# Patient Record
Sex: FEMALE | Race: WHITE | NOT HISPANIC OR LATINO | Employment: OTHER | URBAN - NONMETROPOLITAN AREA
[De-identification: names, ages, dates, MRNs, and addresses within clinical notes are randomized per-mention and may not be internally consistent; named-entity substitution may affect disease eponyms.]

---

## 2023-05-16 ENCOUNTER — OFFICE VISIT (OUTPATIENT)
Dept: CARDIOLOGY | Facility: CLINIC | Age: 63
End: 2023-05-16
Payer: MEDICARE

## 2023-05-16 VITALS
HEIGHT: 66 IN | DIASTOLIC BLOOD PRESSURE: 64 MMHG | HEART RATE: 82 BPM | WEIGHT: 222 LBS | BODY MASS INDEX: 35.68 KG/M2 | OXYGEN SATURATION: 95 % | SYSTOLIC BLOOD PRESSURE: 134 MMHG

## 2023-05-16 DIAGNOSIS — I10 HYPERTENSION, UNSPECIFIED TYPE: ICD-10-CM

## 2023-05-16 DIAGNOSIS — G47.36 HYPOXEMIA ASSOCIATED WITH SLEEP: ICD-10-CM

## 2023-05-16 DIAGNOSIS — G47.33 OSA (OBSTRUCTIVE SLEEP APNEA): Primary | ICD-10-CM

## 2023-05-16 DIAGNOSIS — G47.10 HYPERSOMNIA, UNSPECIFIED: ICD-10-CM

## 2023-05-16 RX ORDER — LISINOPRIL 20 MG/1
1 TABLET ORAL DAILY
COMMUNITY

## 2023-05-16 RX ORDER — ATORVASTATIN CALCIUM 20 MG/1
1 TABLET, FILM COATED ORAL DAILY
COMMUNITY
Start: 2023-02-27

## 2023-05-16 RX ORDER — CITALOPRAM 20 MG/1
1 TABLET ORAL DAILY
COMMUNITY
Start: 2023-04-27

## 2023-05-16 RX ORDER — LEVOTHYROXINE SODIUM 0.1 MG/1
1 TABLET ORAL DAILY
COMMUNITY

## 2023-05-16 RX ORDER — ALBUTEROL SULFATE 90 UG/1
AEROSOL, METERED RESPIRATORY (INHALATION)
COMMUNITY

## 2023-05-16 RX ORDER — DILTIAZEM HYDROCHLORIDE 300 MG/1
1 CAPSULE, COATED, EXTENDED RELEASE ORAL DAILY
COMMUNITY
Start: 2023-03-04

## 2023-05-16 RX ORDER — LEVOCETIRIZINE DIHYDROCHLORIDE 5 MG/1
1 TABLET, FILM COATED ORAL DAILY
COMMUNITY
Start: 2023-04-07

## 2023-05-16 RX ORDER — GABAPENTIN 600 MG/1
1 TABLET ORAL 3 TIMES DAILY
COMMUNITY

## 2023-05-16 RX ORDER — CYCLOBENZAPRINE HCL 10 MG
1 TABLET ORAL DAILY
COMMUNITY
Start: 2023-04-07

## 2023-05-16 RX ORDER — PANTOPRAZOLE SODIUM 40 MG/1
1 TABLET, DELAYED RELEASE ORAL EVERY 12 HOURS SCHEDULED
COMMUNITY

## 2023-05-16 RX ORDER — SUCRALFATE 1 G/1
1 TABLET ORAL 2 TIMES DAILY
COMMUNITY

## 2023-05-16 RX ORDER — TRAMADOL HYDROCHLORIDE 50 MG/1
1 TABLET ORAL DAILY
COMMUNITY

## 2023-05-16 RX ORDER — MONTELUKAST SODIUM 10 MG/1
1 TABLET ORAL DAILY
COMMUNITY

## 2023-05-16 NOTE — ASSESSMENT & PLAN NOTE
Baseline AHI 12 this is mild sleep apnea.  Increases to 67 in REM sleep and that is severe.    She is on CPAP therapy.  Download is reviewed excellent control good compliance at 77%.  She is benefiting from CPAP therapy.  We plan to continue CPAP therapy.  She is encouraged to increase CPAP use.    Prescription for CPAP supplies to the DME of her choice.

## 2023-05-16 NOTE — ASSESSMENT & PLAN NOTE
Blood pressure today 134/64.  She follows with cardiology for tachycardia.  She reports this has been well controlled on her diltiazem.    She is encouraged to continue PAP therapy as untreated sleep apnea may potentiate her hypertension and tachycardia.

## 2023-05-16 NOTE — PROGRESS NOTES
Follow-Up Sleep Consult     Date:   2023  Name: Aly Armando  :   1960  PCP: Steffi Leija APRN    Chief Complaint   Patient presents with   • Sleep Apnea       Subjective     History of Present Illness  Aly Armando is a 63 y.o. female who presents today for follow-up on KO.  Today is a scheduled 6-month follow-up on her known history of mild sleep apnea.  She has a CPAP plus O2 at 2 L nightly.    She reports that she has a bad habit of sitting in front of the television and falling asleep without her Pap device at night.  She reports that occasionally she will wake up at night during CPAP use and realize she is pulled her mask off.  She reports she is having a bit more of excessive daytime sleepiness.  She reports that if she is sitting idle that she will doze off.  She reports she will doze off in front of the TV in the evenings.    She reports her DME is helpful for supplies.  She reports that her fullface mask is a good fit.  She reports that her PAP device is functioning well.  She reports she has an O2 concentrator and her DME changes the filter annually.    No specialty comments available.    History of KO with a baseline AHI of 12 that increased to 67 and REM on a study November 3, 2021.       Current Treatment: Auto CPAP 6 to 16 cm on a 3B device.  She has O2 at 2 L nightly connected to her CPAP.    Current mask full facemask.          The patient's relevant past medical, surgical, family, and social history reviewed and updated in Epic as appropriate.    Past Medical History:   Diagnosis Date   • Anemia    • Gallstones    • GERD (gastroesophageal reflux disease)    • Hypertension    • KO (obstructive sleep apnea)     AHI of 12; REM 67   • Tachycardia    • Thyroid disease      Past Surgical History:   Procedure Laterality Date   • BUNIONECTOMY  2009   •  SECTION  1989   • CHOLECYSTECTOMY  1984     OB History    No obstetric history on file.       No  "Known Allergies  Prior to Admission medications    Medication Sig Start Date End Date Taking? Authorizing Provider   albuterol sulfate  (90 Base) MCG/ACT inhaler albuterol sulfate HFA 90 mcg/actuation aerosol inhaler   Yes Luis Alexis MD   atorvastatin (LIPITOR) 20 MG tablet Take 1 tablet by mouth Daily. 2/27/23  Yes Luis Alexis MD   citalopram (CeleXA) 20 MG tablet Take 1 tablet by mouth Daily. 4/27/23  Yes Luis Alexis MD   cyclobenzaprine (FLEXERIL) 10 MG tablet Take 1 tablet by mouth Daily. 4/7/23  Yes Provider, Historical, MD   dilTIAZem CD (CARDIZEM CD) 300 MG 24 hr capsule Take 1 capsule by mouth Daily. 3/4/23  Yes Provider, Historical, MD   gabapentin (NEURONTIN) 600 MG tablet Take 1 tablet by mouth 3 (Three) Times a Day.   Yes Luis Alexis MD   levocetirizine (XYZAL) 5 MG tablet Take 1 tablet by mouth Daily. 4/7/23  Yes Luis Alexis MD   levothyroxine (SYNTHROID, LEVOTHROID) 100 MCG tablet Take 1 tablet by mouth Daily.   Yes Luis Alexis MD   lisinopril (PRINIVIL,ZESTRIL) 20 MG tablet Take 1 tablet by mouth Daily.   Yes Luis Alexis MD   montelukast (SINGULAIR) 10 MG tablet Take 1 tablet by mouth Daily.   Yes Luis Alexis MD   pantoprazole (PROTONIX) 40 MG EC tablet Take 1 tablet by mouth Every 12 (Twelve) Hours.   Yes Luis Alexis MD   sucralfate (CARAFATE) 1 g tablet Take 1 tablet by mouth 2 (Two) Times a Day.   Yes Luis Alexis MD   traMADol (ULTRAM) 50 MG tablet Take 1 tablet by mouth Daily.   Yes Luis Alexis MD     History reviewed. No pertinent family history.    Objective     Vital Signs:  /64 (BP Location: Left arm, Patient Position: Sitting)   Pulse 82   Ht 167.6 cm (66\")   Wt 101 kg (222 lb)   SpO2 95%   BMI 35.83 kg/m²     Class 2 Severe Obesity (BMI >=35 and <=39.9). Obesity-related health conditions include the following: obstructive sleep apnea, hypertension and GERD. Obesity " is worsening. BMI is is above average; BMI management plan is completed. We discussed portion control and increasing exercise.        Physical Exam  Constitutional:       Appearance: Normal appearance.   HENT:      Head: Normocephalic.   Pulmonary:      Effort: Pulmonary effort is normal.   Musculoskeletal:         General: Normal range of motion.      Cervical back: Neck supple.   Neurological:      Mental Status: She is alert and oriented to person, place, and time.   Psychiatric:         Mood and Affect: Mood normal.         Behavior: Behavior normal.         Thought Content: Thought content normal.         The following data was reviewed by: MISSY Sanchez on 05/16/2023:    PAP download reviewed: CPAP download dated 4/11 through 5/10/2023.  30-day download.  I have reviewed and interpreted the data on this download at today's visit.         Assessment and Plan     Diagnoses and all orders for this visit:    1. KO (obstructive sleep apnea) (Primary)  Assessment & Plan:  Baseline AHI 12 this is mild sleep apnea.  Increases to 67 in REM sleep and that is severe.    She is on CPAP therapy.  Download is reviewed excellent control good compliance at 77%.  She is benefiting from CPAP therapy.  We plan to continue CPAP therapy.  She is encouraged to increase CPAP use.    Prescription for CPAP supplies to the DME of her choice.    Orders:  -     Titration; Future  -     PAP Therapy    2. Hypoxemia associated with sleep  Assessment & Plan:  She has O2 at 2 L connected to her CPAP.  She uses her O2 nightly with CPAP.    She does not use oxygen in the daytime.  Appears oxygen was ordered after having a mildly low pulse ox on CPAP therapy.    Plan an in lab titration for further evaluation.  Quantify the need for oxygen with PAP therapy.    Orders:  -     Titration; Future    3. Hypersomnia, unspecified  Assessment & Plan:  She reports that she is having a bit of excessive daytime sleepiness.  She reports when  she is sitting idle that she will fall asleep.  She will doze off.    She is encouraged to increase her CPAP use to nightly and 7 hours per night.    Planning an in lab titration study for further evaluation as her pressures may need adjusting.      4. Hypertension, unspecified type  Assessment & Plan:  Blood pressure today 134/64.  She follows with cardiology for tachycardia.  She reports this has been well controlled on her diltiazem.    She is encouraged to continue PAP therapy as untreated sleep apnea may potentiate her hypertension and tachycardia.        Report if any new/changing symptoms immediately and Increase pap therapy usage         Follow Up  Return in about 2 months (around 7/16/2023) for KO/ Titration study results .  Patient was given instructions and counseling regarding her condition or for health maintenance advice. Please see specific information pulled into the AVS if appropriate.

## 2023-05-16 NOTE — ASSESSMENT & PLAN NOTE
She has O2 at 2 L connected to her CPAP.  She uses her O2 nightly with CPAP.    She does not use oxygen in the daytime.  Appears oxygen was ordered after having a mildly low pulse ox on CPAP therapy.    Plan an in lab titration for further evaluation.  Quantify the need for oxygen with PAP therapy.

## 2023-05-16 NOTE — ASSESSMENT & PLAN NOTE
She reports that she is having a bit of excessive daytime sleepiness.  She reports when she is sitting idle that she will fall asleep.  She will doze off.    She is encouraged to increase her CPAP use to nightly and 7 hours per night.    Planning an in lab titration study for further evaluation as her pressures may need adjusting.

## 2023-05-24 DIAGNOSIS — G47.33 OSA (OBSTRUCTIVE SLEEP APNEA): ICD-10-CM

## 2023-05-24 DIAGNOSIS — G47.36 HYPOXEMIA ASSOCIATED WITH SLEEP: ICD-10-CM

## 2023-05-26 ENCOUNTER — TELEPHONE (OUTPATIENT)
Dept: CARDIOLOGY | Facility: CLINIC | Age: 63
End: 2023-05-26
Payer: MEDICARE

## 2023-05-26 NOTE — TELEPHONE ENCOUNTER
Talked with patient, patient understood.  Patient states she wants to start the BiPAP with no O2 as soon as she can and she will follow up in July.

## 2023-05-26 NOTE — TELEPHONE ENCOUNTER
----- Message from MISSY Sanchez sent at 5/25/2023  5:52 PM EDT -----  Regarding: Titration study results  Please call the patient and let Her know that I have seen her Titration results. We found out that CPAP is not doing a good enough job and fails to correct her KO. She has given a trial of BIPAP and this did a better job of correcting her KO and it also corrected her Oxygen drops (desats). So, the good news is with BIPAP, it appears on this night of study that she does on need Oxygen. But, I need to order her a New sleep machine- a BIPAP.     Now, if she wishes to come in a discuss all this in more detail then she has a follow up in about 5 weeks. She is save enough on CPAP and O2 to wait until then. Or I can go on and order the BIPAP and she can start it without the O2 and we can see how she is doing on the new BIPAP at her July follow up. Either way is acceptable. Let me know what she decides to do.   ----- Message -----  From: Azeb Coker Incoming  Sent: 5/24/2023   2:08 PM EDT  To: MISSY Sanchez

## 2023-05-30 DIAGNOSIS — G47.33 OSA (OBSTRUCTIVE SLEEP APNEA): Primary | ICD-10-CM

## 2023-10-18 ENCOUNTER — OFFICE VISIT (OUTPATIENT)
Dept: CARDIOLOGY | Facility: CLINIC | Age: 63
End: 2023-10-18
Payer: MEDICARE

## 2023-10-18 VITALS
HEART RATE: 86 BPM | SYSTOLIC BLOOD PRESSURE: 134 MMHG | OXYGEN SATURATION: 94 % | DIASTOLIC BLOOD PRESSURE: 76 MMHG | HEIGHT: 66 IN | BODY MASS INDEX: 36 KG/M2 | WEIGHT: 224 LBS

## 2023-10-18 DIAGNOSIS — G47.36 HYPOXEMIA ASSOCIATED WITH SLEEP: ICD-10-CM

## 2023-10-18 DIAGNOSIS — G47.33 OSA (OBSTRUCTIVE SLEEP APNEA): Primary | ICD-10-CM

## 2023-10-18 PROCEDURE — 1159F MED LIST DOCD IN RCRD: CPT | Performed by: NURSE PRACTITIONER

## 2023-10-18 PROCEDURE — 1160F RVW MEDS BY RX/DR IN RCRD: CPT | Performed by: NURSE PRACTITIONER

## 2023-10-18 PROCEDURE — 3075F SYST BP GE 130 - 139MM HG: CPT | Performed by: NURSE PRACTITIONER

## 2023-10-18 PROCEDURE — 3078F DIAST BP <80 MM HG: CPT | Performed by: NURSE PRACTITIONER

## 2023-10-18 PROCEDURE — 99213 OFFICE O/P EST LOW 20 MIN: CPT | Performed by: NURSE PRACTITIONER

## 2023-10-18 NOTE — PROGRESS NOTES
Follow-Up Sleep Consult     Date:   10/18/2023  Name: Aly Armando  :   1960  PCP: Steffi Leija APRN    Chief Complaint   Patient presents with    Sleep Apnea       Subjective     History of Present Illness  Aly Armando is a 63 y.o. female who presents today for follow-up on KO.  Patient has a history of KO and was recently switched to a BiPAP and is here for a 31-90-day compliance visit.  Patient reports that she is doing much better on the BiPAP device.  Download reviewed and interpreted today.  Compliance is 97%, when used >4 hours is 97%.  Average use per night is 6 hours and 26 minutes.  AHI is 3.6.  Patient denies excessive daytime sleepiness and fatigue.  She is feeling much better at this time.  She was previously using oxygen with her CPAP device.  Per titration study on 2023 oxygen desaturation was present and corrected with BiPAP titration.  Patient has been using oxygen with her BiPAP. I recommend that she stop using it at night with her BiPAP to see how she does with that.  She most likely does not need supplemental oxygen with her BiPAP now. She denies any other concerns or complaints today.  Overall patient is doing very well.    History of KO with baseline AHI of 12 on 11/3/2021, increased to 67 in REM    Titration study 2023-successful BiPAP titration at a pressure setting of 20/14 cm.  There was persistent sleep apnea in supine position on 19 cm.  CPAP failed to correct.  Oxygen desaturation was present and corrected with BiPAP titration.    History of KO with a baseline AHI of 12.     Current Treatment: BiPAP 20/10 cm, PS 4  Current mask used is full face mask    Device Functioning Well: Yes  Mask Fit Comfortable: Yes  Air Flow Comfortable: Yes  DME Helpful for Supplies: Yes  Sleep is rested: Yes    Device Download:                     The patient's relevant past medical, surgical, family, and social history reviewed and updated in Epic as  appropriate.    Past Medical History:   Diagnosis Date    Anemia     Gallstones     GERD (gastroesophageal reflux disease)     Hypertension     KO (obstructive sleep apnea)     AHI of 12; REM 67    Tachycardia     Thyroid disease      Past Surgical History:   Procedure Laterality Date    BUNIONECTOMY  2009     SECTION  1989    CHOLECYSTECTOMY  1984     OB History    No obstetric history on file.       No Known Allergies  Prior to Admission medications    Medication Sig Start Date End Date Taking? Authorizing Provider   albuterol sulfate  (90 Base) MCG/ACT inhaler albuterol sulfate HFA 90 mcg/actuation aerosol inhaler   Yes Luis Alexis MD   atorvastatin (LIPITOR) 20 MG tablet Take 1 tablet by mouth Daily. 23  Yes Luis Alexis MD   citalopram (CeleXA) 20 MG tablet Take 1 tablet by mouth Daily. 23  Yes Luis Alexis MD   cyclobenzaprine (FLEXERIL) 10 MG tablet Take 1 tablet by mouth Daily. 23  Yes Luis Alexis MD   dilTIAZem CD (CARDIZEM CD) 300 MG 24 hr capsule Take 1 capsule by mouth Daily. 3/4/23  Yes ProviderLuis MD   gabapentin (NEURONTIN) 600 MG tablet Take 1 tablet by mouth 3 (Three) Times a Day.   Yes ProviderLuis MD   levocetirizine (XYZAL) 5 MG tablet Take 1 tablet by mouth Daily. 23  Yes Luis Alexis MD   levothyroxine (SYNTHROID, LEVOTHROID) 100 MCG tablet Take 1 tablet by mouth Daily.   Yes ProviderLuis MD   montelukast (SINGULAIR) 10 MG tablet Take 1 tablet by mouth Daily.   Yes ProviderLuis MD   pantoprazole (PROTONIX) 40 MG EC tablet Take 1 tablet by mouth Every 12 (Twelve) Hours.   Yes ProviderLuis MD   sucralfate (CARAFATE) 1 g tablet Take 1 tablet by mouth 2 (Two) Times a Day.   Yes Luis Alexis MD   traMADol (ULTRAM) 50 MG tablet Take 1 tablet by mouth Daily.   Yes Luis Alexis MD   lisinopril (PRINIVIL,ZESTRIL) 20 MG tablet Take 1 tablet by mouth  "Daily.  10/18/23  Provider, Luis, MD     History reviewed. No pertinent family history.    Objective     Vital Signs:  /76   Pulse 86   Ht 167.6 cm (66\")   Wt 102 kg (224 lb)   SpO2 94%   BMI 36.15 kg/m²              Physical Exam  Vitals reviewed.   Constitutional:       Appearance: Normal appearance.   HENT:      Head: Normocephalic.   Cardiovascular:      Rate and Rhythm: Normal rate and regular rhythm.      Heart sounds: Normal heart sounds.   Pulmonary:      Effort: Pulmonary effort is normal.      Breath sounds: Normal breath sounds.   Musculoskeletal:      Right lower leg: No edema.      Left lower leg: No edema.   Skin:     General: Skin is warm and dry.      Capillary Refill: Capillary refill takes less than 2 seconds.   Neurological:      General: No focal deficit present.      Mental Status: She is alert and oriented to person, place, and time.   Psychiatric:         Mood and Affect: Mood normal.         Behavior: Behavior normal.             PAP download reviewed: 9/18/23-10/17/23         Assessment and Plan     Diagnoses and all orders for this visit:    1. KO (obstructive sleep apnea) (Primary)  Assessment & Plan:  Patient completed a titration study on 5/23/2023 that revealed a successful BiPAP titration at a pressure setting of 20/14 cm.  Oxygen desaturation was present and corrected with BiPAP titration.  Patient was recently switched to a BiPAP device and is here for a compliance visit.  Download reviewed and interpreted today.  Compliance is 97%, when used >4 hours is 97%.  Average use per night is 6 hours and 26 minutes.  AHI is 3.6.  Patient denies excessive daytime sleepiness and fatigue.  She is feeling much better at this time.      - Continue BiPAP therapy at current settings.      2. Hypoxemia associated with sleep  Assessment & Plan:  She was previously using oxygen with her CPAP device.  Per titration study on 5/23/2023 oxygen desaturation was present and corrected with " BiPAP titration.  Patient has been using oxygen with her BiPAP at night. I recommend that she stop using it at night with her BiPAP to see how she does with that.  She most likely does not need supplemental oxygen now that she is using a BIPAP.   -We will reassess in 3 months.           Report if any new/changing symptoms immediately         Follow Up  Return in about 3 months (around 1/18/2024) for KO.  Patient was given instructions and counseling regarding her condition or for health maintenance advice. Please see specific information pulled into the AVS if appropriate.

## 2023-10-18 NOTE — ASSESSMENT & PLAN NOTE
Patient completed a titration study on 5/23/2023 that revealed a successful BiPAP titration at a pressure setting of 20/14 cm.  Oxygen desaturation was present and corrected with BiPAP titration.  Patient was recently switched to a BiPAP device and is here for a compliance visit.  Download reviewed and interpreted today.  Compliance is 97%, when used >4 hours is 97%.  Average use per night is 6 hours and 26 minutes.  AHI is 3.6.  Patient denies excessive daytime sleepiness and fatigue.  She is feeling much better at this time.      - Continue BiPAP therapy at current settings.

## 2023-10-18 NOTE — ASSESSMENT & PLAN NOTE
She was previously using oxygen with her CPAP device.  Per titration study on 5/23/2023 oxygen desaturation was present and corrected with BiPAP titration.  Patient has been using oxygen with her BiPAP at night. I recommend that she stop using it at night with her BiPAP to see how she does with that.  She most likely does not need supplemental oxygen now that she is using a BIPAP.   -We will reassess in 3 months.

## 2024-01-17 ENCOUNTER — OFFICE VISIT (OUTPATIENT)
Dept: CARDIOLOGY | Facility: CLINIC | Age: 64
End: 2024-01-17
Payer: MEDICARE

## 2024-01-17 VITALS
WEIGHT: 233 LBS | HEIGHT: 66 IN | BODY MASS INDEX: 37.45 KG/M2 | DIASTOLIC BLOOD PRESSURE: 78 MMHG | OXYGEN SATURATION: 94 % | HEART RATE: 58 BPM | SYSTOLIC BLOOD PRESSURE: 124 MMHG

## 2024-01-17 DIAGNOSIS — G47.33 OSA (OBSTRUCTIVE SLEEP APNEA): Primary | ICD-10-CM

## 2024-01-17 DIAGNOSIS — G47.36 HYPOXEMIA ASSOCIATED WITH SLEEP: ICD-10-CM

## 2024-01-17 RX ORDER — NAPROXEN 500 MG/1
500 TABLET ORAL 2 TIMES DAILY WITH MEALS
COMMUNITY

## 2024-01-17 NOTE — ASSESSMENT & PLAN NOTE
Per try titration study on 5/23/2023 oxygen corrected on BiPAP.  Supplemental oxygen was not needed.    She has been off of her supplemental oxygen that was attached to BiPAP x 3 months.    She reports that she feels good.  She does not feel any different off oxygen.    Plan: Discontinue nocturnal oxygen and order sent to DME.  Continue BiPAP.

## 2024-01-17 NOTE — ASSESSMENT & PLAN NOTE
Baseline AHI is 12.  Increased to 67 in rem.  This is moderate to severe sleep apnea.    She is on BiPAP therapy.  Download is reviewed with good control and good compliance.    She is benefiting from BiPAP therapy with plan to continue BiPAP therapy.    Prescription for PAP supplies to the DME of her choice.    Follow-up in 6 months for KO or sooner for any KO or PAP concerns.

## 2024-01-17 NOTE — PROGRESS NOTES
Follow-Up Sleep Consult     Date:   2024  Name: Aly Armando  :   1960  PCP: Steffi Leija APRN    Chief Complaint   Patient presents with    Sleep Apnea       Subjective     History of Present Illness  Aly Armando is a 63 y.o. female who presents today for follow-up on KO.    Today is a 3-month follow-up to reevaluate how she is feeling off oxygen at night and only on BiPAP at night.    She reports that she feels the same.  She has not felt any different off of her oxygen.    History of KO with baseline AHI of 12 on 11/3/2021, increased to 67 in REM    Titration study 2023-successful BiPAP titration at a pressure setting of 20/14 cm.  There was persistent sleep apnea in supine position on 19 cm.  CPAP failed to correct.  Oxygen desaturation was present and corrected with BiPAP titration.      Current mask used is fullface mask    Device Functioning Well: Yes  Mask Fit Comfortable: Yes - air leaks at times   Air Flow Comfortable: Yes  DME Helpful for Supplies: Yes  Sleep is rested: Yes        Device Download:                     The patient's relevant past medical, surgical, family, and social history reviewed and updated in Epic as appropriate.    Past Medical History:   Diagnosis Date    Anemia     Gallstones     GERD (gastroesophageal reflux disease)     Hypertension     KO (obstructive sleep apnea)     AHI of 12; REM 67    Tachycardia     Thyroid disease      Past Surgical History:   Procedure Laterality Date    BUNIONECTOMY  2009     SECTION  1989    CHOLECYSTECTOMY  1984     OB History    No obstetric history on file.       No Known Allergies  Prior to Admission medications    Medication Sig Start Date End Date Taking? Authorizing Provider   atorvastatin (LIPITOR) 20 MG tablet Take 1 tablet by mouth Daily. 23  Yes Luis Alexis MD   citalopram (CeleXA) 20 MG tablet Take 1 tablet by mouth Daily. 23  Yes Luis Alexis MD  "  cyclobenzaprine (FLEXERIL) 10 MG tablet Take 1 tablet by mouth Daily. 4/7/23  Yes Luis Alexis MD   dilTIAZem CD (CARDIZEM CD) 300 MG 24 hr capsule Take 1 capsule by mouth Daily. 3/4/23  Yes Luis Alexis MD   gabapentin (NEURONTIN) 600 MG tablet Take 1 tablet by mouth 3 (Three) Times a Day.   Yes Luis lAexis MD   levocetirizine (XYZAL) 5 MG tablet Take 1 tablet by mouth Daily. 4/7/23  Yes Luis Alexis MD   levothyroxine (SYNTHROID, LEVOTHROID) 100 MCG tablet Take 1 tablet by mouth Daily.   Yes Luis Alexis MD   montelukast (SINGULAIR) 10 MG tablet Take 1 tablet by mouth Daily.   Yes Provider, Historical, MD   naproxen (NAPROSYN) 500 MG tablet Take 1 tablet by mouth 2 (Two) Times a Day With Meals.   Yes Luis Alexis MD   pantoprazole (PROTONIX) 40 MG EC tablet Take 1 tablet by mouth Every 12 (Twelve) Hours.   Yes Luis Alexis MD   sucralfate (CARAFATE) 1 g tablet Take 1 tablet by mouth 2 (Two) Times a Day.   Yes Luis Alexis MD   traMADol (ULTRAM) 50 MG tablet Take 1 tablet by mouth Daily.   Yes Luis Alexis MD   albuterol sulfate  (90 Base) MCG/ACT inhaler albuterol sulfate HFA 90 mcg/actuation aerosol inhaler  1/17/24  Luis Alexis MD     History reviewed. No pertinent family history.    Objective     Vital Signs:  /78   Pulse 58   Ht 167.6 cm (66\")   Wt 106 kg (233 lb)   SpO2 94%   BMI 37.61 kg/m²              Physical Exam  HENT:      Head: Normocephalic.      Nose: Nose normal.      Mouth/Throat:      Mouth: Mucous membranes are moist.   Pulmonary:      Effort: Pulmonary effort is normal.   Skin:     General: Skin is warm and dry.   Neurological:      Mental Status: She is alert and oriented to person, place, and time.   Psychiatric:         Mood and Affect: Mood normal.         Behavior: Behavior normal.         Thought Content: Thought content normal.         The following data was reviewed by: " Gilma Villasenor, APRN on 01/17/2024:    PAP download reviewed: 12/17/2023 through 1/15/2024.  30-day download.  I have reviewed and interpreted the data on the download.         Assessment and Plan     Diagnoses and all orders for this visit:    1. KO (obstructive sleep apnea) (Primary)  Assessment & Plan:  Baseline AHI is 12.  Increased to 67 in rem.  This is moderate to severe sleep apnea.    She is on BiPAP therapy.  Download is reviewed with good control and good compliance.    She is benefiting from BiPAP therapy with plan to continue BiPAP therapy.    Prescription for PAP supplies to the DME of her choice.    Follow-up in 6 months for KO or sooner for any KO or PAP concerns.    Orders:  -     PAP Therapy    2. Hypoxemia associated with sleep  Assessment & Plan:  Per try titration study on 5/23/2023 oxygen corrected on BiPAP.  Supplemental oxygen was not needed.    She has been off of her supplemental oxygen that was attached to BiPAP x 3 months.    She reports that she feels good.  She does not feel any different off oxygen.    Plan: Discontinue nocturnal oxygen and order sent to DME.  Continue BiPAP.          Report if any new/changing symptoms immediately         Follow Up  Return in about 6 months (around 7/17/2024) for KO.  Patient was given instructions and counseling regarding her condition or for health maintenance advice. Please see specific information pulled into the AVS if appropriate.

## 2024-07-16 ENCOUNTER — OFFICE VISIT (OUTPATIENT)
Dept: CARDIOLOGY | Facility: CLINIC | Age: 64
End: 2024-07-16
Payer: MEDICARE

## 2024-07-16 VITALS
OXYGEN SATURATION: 96 % | WEIGHT: 239 LBS | HEART RATE: 84 BPM | SYSTOLIC BLOOD PRESSURE: 122 MMHG | BODY MASS INDEX: 38.41 KG/M2 | HEIGHT: 66 IN | DIASTOLIC BLOOD PRESSURE: 84 MMHG

## 2024-07-16 DIAGNOSIS — G47.33 OSA (OBSTRUCTIVE SLEEP APNEA): Primary | ICD-10-CM

## 2024-07-16 PROCEDURE — 1159F MED LIST DOCD IN RCRD: CPT | Performed by: NURSE PRACTITIONER

## 2024-07-16 PROCEDURE — 3079F DIAST BP 80-89 MM HG: CPT | Performed by: NURSE PRACTITIONER

## 2024-07-16 PROCEDURE — 1160F RVW MEDS BY RX/DR IN RCRD: CPT | Performed by: NURSE PRACTITIONER

## 2024-07-16 PROCEDURE — 99213 OFFICE O/P EST LOW 20 MIN: CPT | Performed by: NURSE PRACTITIONER

## 2024-07-16 PROCEDURE — 3074F SYST BP LT 130 MM HG: CPT | Performed by: NURSE PRACTITIONER

## 2024-07-16 NOTE — ASSESSMENT & PLAN NOTE
Baseline AHI is 12.  This is mild sleep apnea.  AHI increased to 67 in REM sleep and this is more severe.    She is on BiPAP therapy.  Download reviewed with good control and good compliance.    She is benefiting from BiPAP therapy and we plan to continue BiPAP therapy.    Prescription to the DME of patient's choice for her BiPAP supplies.    Plan follow-up for KO on BiPAP in 1 year or sooner for any KO or PAP concerns.

## 2024-07-16 NOTE — PROGRESS NOTES
Follow-Up Sleep Consult     Date:   2024  Name: Aly Armando  :   1960  PCP: Steffi Leija APRN    Chief Complaint   Patient presents with    Sleep Apnea       Subjective     History of Present Illness  Aly Armando is a 64 y.o. female who presents today for follow-up on KO.  She comes in today for her annual visit for her known history of sleep apnea on BiPAP therapy.    History of KO with baseline AHI of 12 on 11/3/2021, increased to 67 in REM    Titration study 2023-successful BiPAP titration at a pressure setting of 20/14 cm.  There was persistent sleep apnea in supine position on 19 cm.  CPAP failed to correct.  Oxygen desaturation was present and corrected with BiPAP titration.    Current KO therapy: BiPAP 20/10 PS 4      Current mask used is fullface mask    Device Functioning Well: Yes  Mask Fit Comfortable: Yes but she does notice an air leak.  She has dentures and she takes those out to sleep at night.  We discussed making sure that her headgear was replaced every 6 months and consideration to sleep in her denture.  Patient reports she will give this a try.  Air Flow Comfortable: Yes  DME Helpful for Supplies: Yes  Sleep is rested: Yes        Device Download:                     The patient's relevant past medical, surgical, family, and social history reviewed and updated in Epic as appropriate.    Past Medical History:   Diagnosis Date    Anemia     Gallstones     GERD (gastroesophageal reflux disease)     Hypertension     KO (obstructive sleep apnea)     AHI of 12; REM 67    Tachycardia     Thyroid disease      Past Surgical History:   Procedure Laterality Date    BUNIONECTOMY  2009     SECTION  1989    CHOLECYSTECTOMY  1984     OB History    No obstetric history on file.       No Known Allergies  Prior to Admission medications    Medication Sig Start Date End Date Taking? Authorizing Provider   atorvastatin (LIPITOR) 20 MG tablet Take 1 tablet  "by mouth Daily. 2/27/23  Yes Luis Alexis MD   citalopram (CeleXA) 20 MG tablet Take 1 tablet by mouth Daily. 4/27/23  Yes Provider, Historical, MD   cyclobenzaprine (FLEXERIL) 10 MG tablet Take 1 tablet by mouth Daily. 4/7/23  Yes Provider, Historical, MD   dilTIAZem CD (CARDIZEM CD) 300 MG 24 hr capsule Take 1 capsule by mouth Daily. 3/4/23  Yes Provider, Historical, MD   gabapentin (NEURONTIN) 600 MG tablet Take 1 tablet by mouth 3 (Three) Times a Day.   Yes Luis Alexis MD   levocetirizine (XYZAL) 5 MG tablet Take 1 tablet by mouth Daily. 4/7/23  Yes Provider, Historical, MD   levothyroxine (SYNTHROID, LEVOTHROID) 100 MCG tablet Take 1 tablet by mouth Daily.   Yes Luis Alexis MD   montelukast (SINGULAIR) 10 MG tablet Take 1 tablet by mouth Daily.   Yes Provider, Historical, MD   naproxen (NAPROSYN) 500 MG tablet Take 1 tablet by mouth 2 (Two) Times a Day With Meals.   Yes Luis Alexis MD   pantoprazole (PROTONIX) 40 MG EC tablet Take 1 tablet by mouth Every 12 (Twelve) Hours.   Yes Luis Alexis MD   sucralfate (CARAFATE) 1 g tablet Take 1 tablet by mouth 2 (Two) Times a Day.   Yes Provider, Historical, MD   traMADol (ULTRAM) 50 MG tablet Take 1 tablet by mouth Daily.   Yes Luis Alexis MD     History reviewed. No pertinent family history.    Objective     Vital Signs:  /84 (BP Location: Left arm, Patient Position: Sitting, Cuff Size: Adult)   Pulse 84   Ht 167.6 cm (66\")   Wt 108 kg (239 lb)   SpO2 96%   BMI 38.58 kg/m²     Class 2 Severe Obesity (BMI >=35 and <=39.9). Obesity-related health conditions include the following: obstructive sleep apnea, dyslipidemias, and GERD. Obesity is unchanged. BMI is is above average; BMI management plan is completed. We discussed low calorie, low carb based diet program and portion control.        Physical Exam  HENT:      Head: Normocephalic.      Nose: Nose normal.      Mouth/Throat:      Mouth: Mucous " membranes are moist.   Pulmonary:      Effort: Pulmonary effort is normal.   Skin:     General: Skin is warm and dry.   Neurological:      Mental Status: She is alert and oriented to person, place, and time.   Psychiatric:         Mood and Affect: Mood normal.         Behavior: Behavior normal.         Thought Content: Thought content normal.         The following data was reviewed by: MISSY Sanchez on 07/16/2024:    PAP download reviewed: 30-day download as above.  I have reviewed and interpreted the data on the download at today's visit.         Assessment and Plan     Diagnoses and all orders for this visit:    1. KO (obstructive sleep apnea) (Primary)  Assessment & Plan:  Baseline AHI is 12.  This is mild sleep apnea.  AHI increased to 67 in REM sleep and this is more severe.    She is on BiPAP therapy.  Download reviewed with good control and good compliance.    She is benefiting from BiPAP therapy and we plan to continue BiPAP therapy.    Prescription to the DME of patient's choice for her BiPAP supplies.    Plan follow-up for KO on BiPAP in 1 year or sooner for any KO or PAP concerns.    Orders:  -     PAP Therapy                 Follow Up  Return in about 1 year (around 7/16/2025) for KO.  Patient was given instructions and counseling regarding her condition or for health maintenance advice. Please see specific information pulled into the AVS if appropriate.

## 2025-07-23 ENCOUNTER — OFFICE VISIT (OUTPATIENT)
Dept: CARDIOLOGY | Facility: CLINIC | Age: 65
End: 2025-07-23
Payer: MEDICARE

## 2025-07-23 VITALS
DIASTOLIC BLOOD PRESSURE: 62 MMHG | WEIGHT: 222 LBS | HEIGHT: 66 IN | HEART RATE: 82 BPM | SYSTOLIC BLOOD PRESSURE: 124 MMHG | BODY MASS INDEX: 35.68 KG/M2 | OXYGEN SATURATION: 94 %

## 2025-07-23 DIAGNOSIS — G47.33 OSA (OBSTRUCTIVE SLEEP APNEA): Primary | ICD-10-CM

## 2025-07-23 PROCEDURE — 3078F DIAST BP <80 MM HG: CPT | Performed by: NURSE PRACTITIONER

## 2025-07-23 PROCEDURE — 1159F MED LIST DOCD IN RCRD: CPT | Performed by: NURSE PRACTITIONER

## 2025-07-23 PROCEDURE — 3074F SYST BP LT 130 MM HG: CPT | Performed by: NURSE PRACTITIONER

## 2025-07-23 PROCEDURE — 1160F RVW MEDS BY RX/DR IN RCRD: CPT | Performed by: NURSE PRACTITIONER

## 2025-07-23 PROCEDURE — 99213 OFFICE O/P EST LOW 20 MIN: CPT | Performed by: NURSE PRACTITIONER

## 2025-07-23 RX ORDER — ASPIRIN 81 MG
TABLET, DELAYED RELEASE (ENTERIC COATED) ORAL
COMMUNITY
Start: 2024-08-12

## 2025-07-23 RX ORDER — CHOLECALCIFEROL (VITAMIN D3) 1250 MCG
CAPSULE ORAL
COMMUNITY
Start: 2024-11-11

## 2025-07-23 RX ORDER — SPIRONOLACTONE 25 MG/1
25 TABLET ORAL DAILY
COMMUNITY
Start: 2025-04-14 | End: 2025-07-27

## 2025-07-23 NOTE — PROGRESS NOTES
Follow-Up Sleep Consult     Date:   2025  Name: Aly Armando  :   1960  PCP: Steffi Leija APRN    Chief Complaint   Patient presents with    Sleep Apnea       Subjective     History of Present Illness  Aly Armando is a 65 y.o. female who presents today for follow-up on KO.  She comes in today for her annual follow-up for her known history of sleep apnea on BiPAP therapy.    History of KO with baseline AHI of 12 on 11/3/2021, increased to 67 in REM    Titration study 2023-successful BiPAP titration at a pressure setting of 20/14 cm.  There was persistent sleep apnea in supine position on 19 cm.  CPAP failed to correct.  Oxygen desaturation was present and corrected with BiPAP titration.    Current KO therapy: BiPAP 20/10 PS 4      Current mask used is FFM    Device Functioning Well: Yes  Mask Fit Comfortable: Yes  Air Flow Comfortable: Yes  DME Helpful for Supplies: Yes  Sleep is rested: Yes    Device Download:                     The patient's relevant past medical, surgical, family, and social history reviewed and updated in Epic as appropriate.    Past Medical History:   Diagnosis Date    Anemia     Gallstones     GERD (gastroesophageal reflux disease)     Hypertension     KO (obstructive sleep apnea)     AHI of 12; REM 67    Tachycardia     Thyroid disease      Past Surgical History:   Procedure Laterality Date    BUNIONECTOMY  2009     SECTION  1989    CHOLECYSTECTOMY  1984     OB History    No obstetric history on file.       No Known Allergies  Prior to Admission medications    Medication Sig Start Date End Date Taking? Authorizing Provider   atorvastatin (LIPITOR) 20 MG tablet Take 1 tablet by mouth Daily. 23  Yes Luis Alexis MD   Cholecalciferol (Vitamin D3) 1.25 MG (96810 UT) capsule  24  Yes Luis Alexis MD   citalopram (CeleXA) 20 MG tablet Take 1 tablet by mouth Daily. 23  Yes Luis Alexis MD  "  cyclobenzaprine (FLEXERIL) 10 MG tablet Take 1 tablet by mouth Daily. 4/7/23  Yes Luis Alexis MD   Docusate Sodium 100 MG capsule  8/12/24  Yes Luis Alexis MD   gabapentin (NEURONTIN) 600 MG tablet Take 1 tablet by mouth 3 (Three) Times a Day.   Yes Luis Alexis MD   levocetirizine (XYZAL) 5 MG tablet Take 1 tablet by mouth Daily. 4/7/23  Yes Luis Alexis MD   levothyroxine (SYNTHROID, LEVOTHROID) 100 MCG tablet Take 1 tablet by mouth Daily.   Yes Luis Alexis MD   Metoprolol Succinate 100 MG capsule extended-release 24 hour sprinkle  4/14/25  Yes Luis Alexis MD   montelukast (SINGULAIR) 10 MG tablet Take 1 tablet by mouth Daily.   Yes Luis Alexis MD   pantoprazole (PROTONIX) 40 MG EC tablet Take 1 tablet by mouth Every 12 (Twelve) Hours.   Yes Luis Alexis MD   spironolactone (ALDACTONE) 25 MG tablet Take 1 tablet by mouth Daily. 4/14/25 7/27/25 Yes Luis Alexis MD   sucralfate (CARAFATE) 1 g tablet Take 1 tablet by mouth 2 (Two) Times a Day.   Yes Luis Alexis MD   traMADol (ULTRAM) 50 MG tablet Take 1 tablet by mouth Daily.   Yes Luis Alexis MD   VIT B12-METHIONINE-INOS-CHOL IM  1/9/23  Yes Luis Alexis MD   dilTIAZem CD (CARDIZEM CD) 300 MG 24 hr capsule Take 1 capsule by mouth Daily. 3/4/23 7/23/25  Luis Alexis MD   naproxen (NAPROSYN) 500 MG tablet Take 1 tablet by mouth 2 (Two) Times a Day With Meals.  7/23/25  Luis Alexis MD     History reviewed. No pertinent family history.    Objective     Vital Signs:  /62 (BP Location: Left arm, Patient Position: Sitting, Cuff Size: Large Adult)   Pulse 82   Ht 167.6 cm (66\")   Wt 101 kg (222 lb)   SpO2 94%   BMI 35.83 kg/m²     Class 2 Severe Obesity (BMI >=35 and <=39.9). Obesity-related health conditions include the following: obstructive sleep apnea, hypertension, dyslipidemias, and GERD. Obesity is improving with lifestyle " modifications. BMI is is above average; BMI management plan is completed. We discussed portion control.        Physical Exam  HENT:      Head: Normocephalic.      Nose: Nose normal.      Mouth/Throat:      Mouth: Mucous membranes are moist.   Pulmonary:      Effort: Pulmonary effort is normal.   Skin:     General: Skin is warm and dry.   Neurological:      Mental Status: She is alert and oriented to person, place, and time.   Psychiatric:         Mood and Affect: Mood normal.         Behavior: Behavior normal.         Thought Content: Thought content normal.         The following data was reviewed by: MISSY Sanchez on 07/23/2025:    PAP download reviewed: 30-day download as above.  I have reviewed and interpreted the data on the download at today's visit         Assessment and Plan     Diagnoses and all orders for this visit:    1. KO (obstructive sleep apnea) (Primary)  Assessment & Plan:  Known history of mild sleep apnea.  AHI increases to 67 in rem sleep and this is more severe.    She is on BiPAP therapy.  Download reviewed with good control and good compliance.    She is benefiting from PAP therapy.    We plan to continue PAP therapy.    Prescription to DME of patient's choice for PAP supplies.    Follow-up for KO on BiPAP therapy in 1 year or sooner for any KO or PAP concerns.    Orders:  -     PAP Therapy        Report if any new/changing symptoms immediately         Follow Up  Return in about 1 year (around 7/23/2026) for KO.  Patient was given instructions and counseling regarding her condition or for health maintenance advice. Please see specific information pulled into the AVS if appropriate.

## 2025-07-23 NOTE — ASSESSMENT & PLAN NOTE
Known history of mild sleep apnea.  AHI increases to 67 in rem sleep and this is more severe.    She is on BiPAP therapy.  Download reviewed with good control and good compliance.    She is benefiting from PAP therapy.    We plan to continue PAP therapy.    Prescription to DME of patient's choice for PAP supplies.    Follow-up for KO on BiPAP therapy in 1 year or sooner for any KO or PAP concerns.